# Patient Record
(demographics unavailable — no encounter records)

---

## 2024-10-31 NOTE — REVIEW OF SYSTEMS
[Negative] : Neurologic [FreeTextEntry3] : per HPI [FreeTextEntry8] : per HPI [de-identified] : per HPI [de-identified] : per HPI

## 2024-10-31 NOTE — PHYSICAL EXAM
[General Appearance - In No Acute Distress] : no acute distress [] : no respiratory distress [Respiration, Rhythm And Depth] : normal respiratory rhythm and effort [Exaggerated Use Of Accessory Muscles For Inspiration] : no accessory muscle use [Skin Color & Pigmentation] : normal skin color and pigmentation [Oriented To Time, Place, And Person] : oriented to person, place, and time [Impaired Insight] : insight and judgment were intact [Affect] : the affect was normal

## 2024-10-31 NOTE — PLAN
[TextEntry] : Reviewed treatments for sleep apnea - reviewed CPAP as first line therapy; reviewed OA, surgical options mainly Inspire. Will try CPAP as APAP. Reviewed use of CPAP. Different mask choices. Desensitization. Compliance goals.  Reviewed short and long term health consequences. Weight loss.  All questions answered.

## 2024-10-31 NOTE — HISTORY OF PRESENT ILLNESS
[Home] : at home, [unfilled] , at the time of the visit. [Medical Office: ([facility])___] : at the medical office located in  [Verbal consent obtained from patient] : the patient, [unfilled] [FreeTextEntry1] : Heavy snoring, EDS, nonrestorative sleep; no witnessed apneas, gasping, morning HAs.   HST done 10/1/24 showed mod NATALIE.   Hx sinusitis.  BMI >30.  Never smoked.  Works in real [employer]. [Daytime Somnolence] : daytime somnolence [Date: ___] : Date of most recent diagnostic polysomnogram: [unfilled] [AHI: ___ per hour] : Apnea-hypopnea index:  [unfilled] per hour [T90%: ___] : T90%: [unfilled]% [ESS] : 7